# Patient Record
Sex: MALE | Race: WHITE | NOT HISPANIC OR LATINO | URBAN - METROPOLITAN AREA
[De-identification: names, ages, dates, MRNs, and addresses within clinical notes are randomized per-mention and may not be internally consistent; named-entity substitution may affect disease eponyms.]

---

## 2021-05-27 ENCOUNTER — RECORDS - HEALTHEAST (OUTPATIENT)
Dept: ADMINISTRATIVE | Facility: CLINIC | Age: 31
End: 2021-05-27

## 2022-07-28 ENCOUNTER — HOSPITAL ENCOUNTER (EMERGENCY)
Facility: CLINIC | Age: 32
Discharge: HOME | End: 2022-07-28
Attending: EMERGENCY MEDICINE

## 2022-07-28 VITALS
HEART RATE: 123 BPM | RESPIRATION RATE: 16 BRPM | TEMPERATURE: 98 F | DIASTOLIC BLOOD PRESSURE: 84 MMHG | SYSTOLIC BLOOD PRESSURE: 137 MMHG | WEIGHT: 165 LBS

## 2022-07-28 DIAGNOSIS — Z53.21 PATIENT LEFT WITHOUT BEING SEEN: ICD-10-CM

## 2022-07-29 NOTE — ED PROVIDER NOTES
LEFT WITHOUT BEING SEEN    The pt left triage before being seen. I did not evaluate this patient.    MD Jayson Finch Oliver, MD  07/29/22 0104

## 2022-07-29 NOTE — ED TRIAGE NOTES
Patient is here with a softball to his head. He did not loose consciousness. He has a slight headache and pain to the area.